# Patient Record
Sex: MALE | Race: WHITE | URBAN - METROPOLITAN AREA
[De-identification: names, ages, dates, MRNs, and addresses within clinical notes are randomized per-mention and may not be internally consistent; named-entity substitution may affect disease eponyms.]

---

## 2023-04-05 ENCOUNTER — APPOINTMENT (RX ONLY)
Dept: URBAN - METROPOLITAN AREA CLINIC 63 | Facility: CLINIC | Age: 63
Setting detail: DERMATOLOGY
End: 2023-04-05

## 2023-04-05 DIAGNOSIS — L63.8 OTHER ALOPECIA AREATA: ICD-10-CM

## 2023-04-05 PROCEDURE — 99203 OFFICE O/P NEW LOW 30 MIN: CPT

## 2023-04-05 PROCEDURE — ? ADDITIONAL NOTES

## 2023-04-05 PROCEDURE — ? PRESCRIPTION

## 2023-04-05 PROCEDURE — ? COUNSELING

## 2023-04-05 RX ORDER — CLOBETASOL PROPIONATE 0.5 MG/G
CREAM TOPICAL BID
Qty: 120 | Refills: 3 | Status: ERX | COMMUNITY
Start: 2023-04-05

## 2023-04-05 RX ADMIN — CLOBETASOL PROPIONATE: 0.5 CREAM TOPICAL at 00:00

## 2023-04-05 ASSESSMENT — LOCATION ZONE DERM
LOCATION ZONE: SCALP
LOCATION ZONE: FACE

## 2023-04-05 ASSESSMENT — LOCATION DETAILED DESCRIPTION DERM
LOCATION DETAILED: LEFT CENTRAL EYEBROW
LOCATION DETAILED: POSTERIOR MID-PARIETAL SCALP
LOCATION DETAILED: RIGHT CENTRAL EYEBROW

## 2023-04-05 ASSESSMENT — LOCATION SIMPLE DESCRIPTION DERM
LOCATION SIMPLE: POSTERIOR SCALP
LOCATION SIMPLE: LEFT EYEBROW
LOCATION SIMPLE: RIGHT EYEBROW

## 2023-04-05 NOTE — HPI: HAIR LOSS
Previous Labs: Yes
How Did The Hair Loss Occur?: sudden in onset
How Severe Is Your Hair Loss?: moderate
When Were The Labs Drawn? (Drawn...): Feb 28 2023
Lab Details: Normal

## 2023-04-05 NOTE — PROCEDURE: ADDITIONAL NOTES
Detail Level: Simple
Render Risk Assessment In Note?: no
Additional Notes: Patient filled out [employer] paperwork and [facility].

## 2023-04-18 ENCOUNTER — RX ONLY (OUTPATIENT)
Age: 63
Setting detail: RX ONLY
End: 2023-04-18

## 2023-04-18 RX ORDER — BARICITINIB 2 MG/1
TABLET, FILM COATED ORAL
Qty: 30 | Refills: 11 | Status: ERX | COMMUNITY
Start: 2023-04-18